# Patient Record
Sex: FEMALE | Race: WHITE | Employment: FULL TIME | URBAN - METROPOLITAN AREA
[De-identification: names, ages, dates, MRNs, and addresses within clinical notes are randomized per-mention and may not be internally consistent; named-entity substitution may affect disease eponyms.]

---

## 2017-02-17 ENCOUNTER — HOSPITAL ENCOUNTER (OUTPATIENT)
Dept: GENERAL RADIOLOGY | Age: 57
Discharge: HOME | End: 2017-02-17
Attending: FAMILY MEDICINE
Payer: COMMERCIAL

## 2017-02-17 DIAGNOSIS — R05.9 COUGH: ICD-10-CM

## 2017-02-17 PROCEDURE — 71020 XR CHEST PA LAT: CPT

## 2018-03-16 ENCOUNTER — HOSPITAL ENCOUNTER (EMERGENCY)
Age: 58
Discharge: HOME | End: 2018-03-16
Attending: EMERGENCY MEDICINE | Admitting: EMERGENCY MEDICINE
Payer: COMMERCIAL

## 2018-03-16 VITALS
HEIGHT: 63 IN | OXYGEN SATURATION: 97 % | TEMPERATURE: 98 F | DIASTOLIC BLOOD PRESSURE: 81 MMHG | WEIGHT: 160 LBS | RESPIRATION RATE: 18 BRPM | SYSTOLIC BLOOD PRESSURE: 138 MMHG | HEART RATE: 85 BPM | BODY MASS INDEX: 28.35 KG/M2

## 2018-03-16 DIAGNOSIS — T78.3XXA ANGIOEDEMA, INITIAL ENCOUNTER: Primary | ICD-10-CM

## 2018-03-16 PROCEDURE — 96376 TX/PRO/DX INJ SAME DRUG ADON: CPT

## 2018-03-16 PROCEDURE — 96374 THER/PROPH/DIAG INJ IV PUSH: CPT

## 2018-03-16 PROCEDURE — 96375 TX/PRO/DX INJ NEW DRUG ADDON: CPT

## 2018-03-16 PROCEDURE — 99284 EMERGENCY DEPT VISIT MOD MDM: CPT

## 2018-03-16 PROCEDURE — 74011250636 HC RX REV CODE- 250/636: Performed by: EMERGENCY MEDICINE

## 2018-03-16 PROCEDURE — 74011000250 HC RX REV CODE- 250: Performed by: EMERGENCY MEDICINE

## 2018-03-16 RX ORDER — DIPHENHYDRAMINE HYDROCHLORIDE 50 MG/ML
25 INJECTION, SOLUTION INTRAMUSCULAR; INTRAVENOUS
Status: COMPLETED | OUTPATIENT
Start: 2018-03-16 | End: 2018-03-16

## 2018-03-16 RX ORDER — FAMOTIDINE 10 MG/ML
20 INJECTION INTRAVENOUS
Status: COMPLETED | OUTPATIENT
Start: 2018-03-16 | End: 2018-03-16

## 2018-03-16 RX ORDER — PREDNISONE 20 MG/1
60 TABLET ORAL DAILY
Qty: 15 TAB | Refills: 0 | Status: SHIPPED | OUTPATIENT
Start: 2018-03-16 | End: 2018-03-21

## 2018-03-16 RX ORDER — EPINEPHRINE 0.3 MG/.3ML
0.3 INJECTION SUBCUTANEOUS
Qty: 0.6 ML | Refills: 2 | Status: SHIPPED | OUTPATIENT
Start: 2018-03-16

## 2018-03-16 RX ORDER — FAMOTIDINE 20 MG/1
20 TABLET, FILM COATED ORAL 2 TIMES DAILY
Qty: 20 TAB | Refills: 0 | Status: SHIPPED | OUTPATIENT
Start: 2018-03-16 | End: 2018-03-24

## 2018-03-16 RX ADMIN — DIPHENHYDRAMINE HYDROCHLORIDE 25 MG: 50 INJECTION, SOLUTION INTRAMUSCULAR; INTRAVENOUS at 06:54

## 2018-03-16 RX ADMIN — METHYLPREDNISOLONE SODIUM SUCCINATE 125 MG: 125 INJECTION, POWDER, FOR SOLUTION INTRAMUSCULAR; INTRAVENOUS at 06:52

## 2018-03-16 RX ADMIN — FAMOTIDINE 20 MG: 10 INJECTION INTRAVENOUS at 06:55

## 2018-03-16 RX ADMIN — DIPHENHYDRAMINE HYDROCHLORIDE 25 MG: 50 INJECTION, SOLUTION INTRAMUSCULAR; INTRAVENOUS at 08:23

## 2018-03-16 NOTE — ED TRIAGE NOTES
Patient started having swelling on the left side of her face last night, took benadryl, when woke up this morning it was worse. States has been getting random hives over the last couple of months. unk what is causing it.

## 2018-03-16 NOTE — DISCHARGE INSTRUCTIONS
Angioedema: Care Instructions  Your Care Instructions    Angioedema is an allergic reaction. It causes swelling and welts in the deep layers of the skin. Angioedema can sometimes occur along with hives. Hives are an allergic reaction in the outer layers of the skin. Angioedema can range from mild to severe. Painful welts can develop on the face. Angioedema can also occur on other parts of the body. In severe cases, the inside of the throat can swell and make it hard to breathe. Many things can cause this condition, including foods, insect bites, and medicines (such as aspirin and some blood pressure medicines). It also can run in families. Sometimes you may know what caused the reaction, but other times you may not know. Follow-up care is a key part of your treatment and safety. Be sure to make and go to all appointments, and call your doctor if you are having problems. It's also a good idea to know your test results and keep a list of the medicines you take. How can you care for yourself at home? · Take your medicines exactly as prescribed. Call your doctor if you think you are having a problem with your medicine. You will get more details on the specific medicines your doctor prescribes. Some medicines used to treat angioedema can make you too sleepy to drive safely. Do not drive if you take medicine that may make you sleepy. · Avoid foods or medicine that may have triggered the swelling. · For comfort:  ¨ Try taking a cool bath. Or place a cool, wet towel on the swollen area. ¨ Avoid hot baths and showers. ¨ Wear loose clothing. · Your doctor may prescribe a shot of epinephrine to carry with you in case you have a severe reaction. Learn how to give yourself the shot and keep it with you at all times. Make sure it has not . When should you call for help? Give an epinephrine shot if:  ? · You think you are having a severe allergic reaction. ? After giving an epinephrine shot call 911, even if you feel better. ?Call 911 if:  ? · You have symptoms of a severe allergic reaction. These may include:  ¨ Sudden raised, red areas (hives) all over your body. ¨ Swelling of the throat, mouth, lips, or tongue. ¨ Trouble breathing. ¨ Passing out (losing consciousness). Or you may feel very lightheaded or suddenly feel weak, confused, or restless. ? · You have been given an epinephrine shot, even if you feel better. ?Call your doctor now or seek immediate medical care if:  ? · You have symptoms of an allergic reaction, such as:  ¨ A rash or hives (raised, red areas on the skin). ¨ Itching. ¨ Swelling. ¨ Belly pain, nausea, or vomiting. ? Watch closely for changes in your health, and be sure to contact your doctor if:  ? · You do not get better as expected. Where can you learn more? Go to http://cricket-sanju.info/. Enter J709 in the search box to learn more about \"Angioedema: Care Instructions. \"  Current as of: September 29, 2016  Content Version: 11.4  © 5821-0415 [employer]. Care instructions adapted under license by [employer] (which disclaims liability or warranty for this information). If you have questions about a medical condition or this instruction, always ask your healthcare professional. [place] [address] any warranty or liability for your use of this information. Allergic Reaction: Care Instructions  Your Care Instructions    An allergic reaction is an excessive response from your immune system to a medicine, chemical, food, insect bite, or other substance. A reaction can range from mild to life-threatening. Some people have a mild rash, hives, and itching or stomach cramps. In severe reactions, swelling of your tongue and throat can close up your airway so that you cannot breathe. Follow-up care is a key part of your treatment and safety.  Be sure to make and go to all appointments, and call your doctor if you are having problems. It's also a good idea to know your test results and keep a list of the medicines you take. How can you care for yourself at home? · If you know what caused your allergic reaction, be sure to avoid it. Your allergy may become more severe each time you have a reaction. · Take an over-the-counter antihistamine, such as cetirizine (Zyrtec) or loratadine (Claritin), to treat mild symptoms. Read and follow directions on the label. Some antihistamines can make you feel sleepy. Do not give antihistamines to a child unless you have checked with your doctor first. Mild symptoms include sneezing or an itchy or runny nose; an itchy mouth; a few hives or mild itching; and mild nausea or stomach discomfort. · Do not scratch hives or a rash. Put a cold, moist towel on them or take cool baths to relieve itching. Put ice packs on hives, swelling, or insect stings for 10 to 15 minutes at a time. Put a thin cloth between the ice pack and your skin. Do not take hot baths or showers. They will make the itching worse. · Your doctor may prescribe a shot of epinephrine to carry with you in case you have a severe reaction. Learn how to give yourself the shot and keep it with you at all times. Make sure it is not . · Go to the emergency room every time you have a severe reaction, even if you have used your shot of epinephrine and are feeling better. Symptoms can come back after a shot. · Wear medical alert jewelry that lists your allergies. You can buy this at most [employer]. · If your child has a severe allergy, make sure that his or her teachers, babysitters, coaches, and other caregivers know about the allergy. They should have an epinephrine shot, know how and when to give it, and have a plan to take your child to the hospital.  When should you call for help? Give an epinephrine shot if:  ? · You think you are having a severe allergic reaction.    ? · You have symptoms in more than one body area, such as mild nausea and an itchy mouth. ? After giving an epinephrine shot call 911, even if you feel better. ?Call 911 if:  ? · You have symptoms of a severe allergic reaction. These may include:  ¨ Sudden raised, red areas (hives) all over your body. ¨ Swelling of the throat, mouth, lips, or tongue. ¨ Trouble breathing. ¨ Passing out (losing consciousness). Or you may feel very lightheaded or suddenly feel weak, confused, or restless. ? · You have been given an epinephrine shot, even if you feel better. ?Call your doctor now or seek immediate medical care if:  ? · You have symptoms of an allergic reaction, such as:  ¨ A rash or hives (raised, red areas on the skin). ¨ Itching. ¨ Swelling. ¨ Belly pain, nausea, or vomiting. ? Watch closely for changes in your health, and be sure to contact your doctor if:  ? · You do not get better as expected. Where can you learn more? Go to http://cricket-sanju.info/. Enter F887 in the search box to learn more about \"Allergic Reaction: Care Instructions. \"  Current as of: September 29, 2016  Content Version: 11.4  © 0153-3977 [employer]. Care instructions adapted under license by [employer] (which disclaims liability or warranty for this information). If you have questions about a medical condition or this instruction, always ask your healthcare professional. [place] [address] any warranty or liability for your use of this information.

## 2018-03-16 NOTE — ED NOTES
Assumed care of pt from triage. Pt presents to ED with chief complaint of swelling to left side of face. PT reports the left side above her mouth started swelling last night around 2300 and this morning her whole left side of face was swollen. Pt denies any one sided weakness, denies numbness, denies SOB, denies trouble swallowing, denies blurry vision and denies dental pain. Pt reports she had hives off and on for the past few months and two days ago had hives to right hand. Pt reports she took a benadryl for the hives and they went away. Pt denies taking any new medications, eating or drinking anything new and denies any new lotions, soaps or detergents. Pt is A&O x 4. Pt denies any other symptoms at this time. Pt resting comfortably on the stretcher in a position of comfort. Pt in no acute distress at this time. Call bell within reach. Side rails x 2. Cardiac monitor x 3. Stretcher locked in the lowest position. Pt aware of plan to await for MD/AMBER/NP assessment, and pt/family verbalizes understanding. Will continue to monitor.

## 2018-03-16 NOTE — Clinical Note
- Prednisone for 5 days. Begin today by noon. - Benadryl 25-50 mg by mouth every 4 hrs as needed. - Pepcid twice a day for 5 days. - Epipen for any difficulty breathing. 
- Please follow-up with allergy. 
- Return to ED for any concerns.

## 2018-03-16 NOTE — ED NOTES
7:25 AM  Change of shift to Dr. Leona Delgado. Care of patient taken over from Dr. Mary Gay; H&P reviewed, handoff complete. Awaiting labs/imaging/consultant. 9:50 AM  Minimal left sided facial swelling. No airway compromise. Feels better. Swelling is improving    9:51 AM  Patient's results have been reviewed with them. Patient and/or family have verbally conveyed their understanding and agreement of the patient's signs, symptoms, diagnosis, treatment and prognosis and additionally agree to follow up as recommended or return to the Emergency Room should their condition change prior to follow-up. Discharge instructions have also been provided to the patient with some educational information regarding their diagnosis as well a list of reasons why they would want to return to the ER prior to their follow-up appointment should their condition change.

## 2018-03-16 NOTE — ED NOTES
Bedside and verbal report given to Fidelia Oshea RN on [facility] at shift change for routine progression of care. Report consisted of patients Situation, Background, Assessment and Recommendations(SBAR). Information from the following report(s) SBAR, ED Summary, [facility] and Recent Results was reviewed with the receiving nurse. Opportunity for questions and clarification was provided.

## 2018-03-16 NOTE — ED PROVIDER NOTES
HPI Comments: The patient presents to the ED with L sided facial swelling. The swelling began last night at 11 PM. She took 25 mg of Benadryl. When she awoke this AM, she had more facial swelling. She has been getting intermittent hives for the last few months. Usually, the symptoms resolve with Benadyrl. She has no known allergies. She has not seen an allergist. She has never had facial swelling. She is not on and ACE-I or and ARB. She denies any difficulty breathing or difficulty swallowing. She denies any new soaps, detergents, cosmetics, lotions, foods, etc. She has no other concerns. Patient is a 63 y.o. female presenting with allergic reaction. The history is provided by the patient and the spouse. Allergic Reaction    Pertinent negatives include no nausea, no vomiting and no shortness of breath. Past Medical History:   Diagnosis Date    Adenomyosis     AR (allergic rhinitis) 6/10/2009    Asthma 6/10/2009    Dysfunctional uterine bleeding     Fibromyalgia 6/10/2009    Generalized anxiety disorder     GERD (gastroesophageal reflux disease) 6/10/2009    Hypermenorrhea     Infertility, female     Inflammatory arthritis     Iron deficiency anemia 6/10/2009       Past Surgical History:   Procedure Laterality Date    HX  SECTION      HX DILATION AND CURETTAGE           Family History:   Problem Relation Age of Onset    Hypertension Mother     Heart Disease Father     Hypertension Father     Cancer Paternal Aunt      brain       Social History     Social History    Marital status:      Spouse name: N/A    Number of children: N/A    Years of education: N/A     Occupational History    Not on file.      Social History Main Topics    Smoking status: Never Smoker    Smokeless tobacco: Never Used    Alcohol use Yes      Comment: occasionally    Drug use: No    Sexual activity: Not Currently     Birth control/ protection: None     Other Topics Concern    Not on file Social History Narrative         ALLERGIES: Hydrocodone-guaifenesin and Other medication    Review of Systems   Constitutional: Negative for appetite change, chills and fever. HENT: Positive for facial swelling. Negative for congestion, dental problem, nosebleeds and sore throat. Eyes: Negative for pain and discharge. Respiratory: Negative for cough and shortness of breath. Cardiovascular: Negative for chest pain. Gastrointestinal: Negative for abdominal pain, diarrhea, nausea and vomiting. Genitourinary: Negative for dysuria. Musculoskeletal: Negative. Skin: Negative for rash. Neurological: Negative for weakness and headaches. Hematological: Negative for adenopathy. Psychiatric/Behavioral: Negative. All other systems reviewed and are negative. Vitals:    03/16/18 0627   BP: (!) 155/98   Pulse: 85   Resp: 18   Temp: 98 °F (36.7 °C)   SpO2: 98%   Weight: 72.6 kg (160 lb)   Height: 5' 3\" (1.6 m)            Physical Exam   Constitutional: She is oriented to person, place, and time. She appears well-developed and well-nourished. HENT:   Head: Normocephalic and atraumatic. Mouth/Throat: Oropharynx is clear and moist.   L lower facial edema noted. Eyes: Conjunctivae are normal.   Neck: Normal range of motion. Neck supple. Cardiovascular: Normal rate, regular rhythm and normal heart sounds. Pulmonary/Chest: Effort normal and breath sounds normal.   Abdominal: Soft. Bowel sounds are normal. There is no tenderness. Musculoskeletal: Normal range of motion. She exhibits no edema or tenderness. Neurological: She is alert and oriented to person, place, and time. Skin: Skin is warm and dry. Psychiatric: She has a normal mood and affect. Her behavior is normal.   Nursing note and vitals reviewed. [facility]      ED Course       Procedures    A/P:  1. Angioedema - unclear etiology. Benadryl, Prednisone, Pepcid. F/U with allergist.    7:40 AM  Swelling slightly improved.  She would like another dose of Benadryl.    8:03 AM  Change of shift. Care of patient signed over to Dr. Ousmane Batista. Handoff complete.

## 2018-03-24 ENCOUNTER — HOSPITAL ENCOUNTER (EMERGENCY)
Age: 58
Discharge: HOME | End: 2018-03-24
Attending: EMERGENCY MEDICINE
Payer: COMMERCIAL

## 2018-03-24 ENCOUNTER — APPOINTMENT (OUTPATIENT)
Dept: GENERAL RADIOLOGY | Age: 58
End: 2018-03-24
Attending: NURSE PRACTITIONER
Payer: COMMERCIAL

## 2018-03-24 VITALS
SYSTOLIC BLOOD PRESSURE: 116 MMHG | WEIGHT: 165 LBS | DIASTOLIC BLOOD PRESSURE: 69 MMHG | HEIGHT: 63 IN | RESPIRATION RATE: 17 BRPM | OXYGEN SATURATION: 93 % | TEMPERATURE: 98.1 F | BODY MASS INDEX: 29.23 KG/M2 | HEART RATE: 95 BPM

## 2018-03-24 DIAGNOSIS — M79.10 MYALGIA: ICD-10-CM

## 2018-03-24 DIAGNOSIS — L50.9 URTICARIA: ICD-10-CM

## 2018-03-24 DIAGNOSIS — R10.13 ABDOMINAL PAIN, EPIGASTRIC: Primary | ICD-10-CM

## 2018-03-24 DIAGNOSIS — R11.0 NAUSEA WITHOUT VOMITING: ICD-10-CM

## 2018-03-24 LAB
ALBUMIN SERPL-MCNC: 4 G/DL (ref 3.5–5)
ALBUMIN/GLOB SERPL: 1.1 {RATIO} (ref 1.1–2.2)
ALP SERPL-CCNC: 88 U/L (ref 45–117)
ALT SERPL-CCNC: 20 U/L (ref 12–78)
ANION GAP SERPL CALC-SCNC: 9 MMOL/L (ref 5–15)
AST SERPL-CCNC: 18 U/L (ref 15–37)
BASOPHILS # BLD: 0 K/UL (ref 0–0.1)
BASOPHILS NFR BLD: 0 % (ref 0–1)
BILIRUB SERPL-MCNC: 0.6 MG/DL (ref 0.2–1)
BUN SERPL-MCNC: 18 MG/DL (ref 6–20)
BUN/CREAT SERPL: 17 (ref 12–20)
CALCIUM SERPL-MCNC: 9.1 MG/DL (ref 8.5–10.1)
CHLORIDE SERPL-SCNC: 102 MMOL/L (ref 97–108)
CO2 SERPL-SCNC: 31 MMOL/L (ref 21–32)
CREAT SERPL-MCNC: 1.08 MG/DL (ref 0.55–1.02)
DIFFERENTIAL METHOD BLD: ABNORMAL
EOSINOPHIL # BLD: 0.1 K/UL (ref 0–0.4)
EOSINOPHIL NFR BLD: 1 % (ref 0–7)
ERYTHROCYTE [DISTWIDTH] IN BLOOD BY AUTOMATED COUNT: 13.2 % (ref 11.5–14.5)
GLOBULIN SER CALC-MCNC: 3.8 G/DL (ref 2–4)
GLUCOSE SERPL-MCNC: 103 MG/DL (ref 65–100)
HCT VFR BLD AUTO: 47.8 % (ref 35–47)
HGB BLD-MCNC: 15.8 G/DL (ref 11.5–16)
IMM GRANULOCYTES # BLD: 0.1 K/UL (ref 0–0.04)
IMM GRANULOCYTES NFR BLD AUTO: 1 % (ref 0–0.5)
LYMPHOCYTES # BLD: 3.4 K/UL (ref 0.8–3.5)
LYMPHOCYTES NFR BLD: 27 % (ref 12–49)
MCH RBC QN AUTO: 30.1 PG (ref 26–34)
MCHC RBC AUTO-ENTMCNC: 33.1 G/DL (ref 30–36.5)
MCV RBC AUTO: 91 FL (ref 80–99)
MONOCYTES # BLD: 1 K/UL (ref 0–1)
MONOCYTES NFR BLD: 8 % (ref 5–13)
NEUTS SEG # BLD: 8 K/UL (ref 1.8–8)
NEUTS SEG NFR BLD: 64 % (ref 32–75)
NRBC # BLD: 0 K/UL (ref 0–0.01)
NRBC BLD-RTO: 0 PER 100 WBC
PLATELET # BLD AUTO: 216 K/UL (ref 150–400)
PMV BLD AUTO: 9.7 FL (ref 8.9–12.9)
POTASSIUM SERPL-SCNC: 4 MMOL/L (ref 3.5–5.1)
PROT SERPL-MCNC: 7.8 G/DL (ref 6.4–8.2)
RBC # BLD AUTO: 5.25 M/UL (ref 3.8–5.2)
SODIUM SERPL-SCNC: 142 MMOL/L (ref 136–145)
WBC # BLD AUTO: 12.5 K/UL (ref 3.6–11)

## 2018-03-24 PROCEDURE — 74011250637 HC RX REV CODE- 250/637: Performed by: NURSE PRACTITIONER

## 2018-03-24 PROCEDURE — 85025 COMPLETE CBC W/AUTO DIFF WBC: CPT | Performed by: NURSE PRACTITIONER

## 2018-03-24 PROCEDURE — 71046 X-RAY EXAM CHEST 2 VIEWS: CPT

## 2018-03-24 PROCEDURE — 99284 EMERGENCY DEPT VISIT MOD MDM: CPT

## 2018-03-24 PROCEDURE — 80053 COMPREHEN METABOLIC PANEL: CPT | Performed by: NURSE PRACTITIONER

## 2018-03-24 PROCEDURE — 74011000250 HC RX REV CODE- 250: Performed by: NURSE PRACTITIONER

## 2018-03-24 PROCEDURE — 36415 COLL VENOUS BLD VENIPUNCTURE: CPT | Performed by: NURSE PRACTITIONER

## 2018-03-24 RX ORDER — FAMOTIDINE 20 MG/1
20 TABLET, FILM COATED ORAL 2 TIMES DAILY
Qty: 20 TAB | Refills: 0 | Status: SHIPPED | OUTPATIENT
Start: 2018-03-24 | End: 2018-04-03

## 2018-03-24 RX ADMIN — LIDOCAINE HYDROCHLORIDE 40 ML: 20 SOLUTION ORAL; TOPICAL at 17:36

## 2018-03-24 NOTE — DISCHARGE INSTRUCTIONS

## 2018-03-24 NOTE — ED PROVIDER NOTES
HPI Comments: The patient is a 56-year-old female with a past medical history significant for lupus, iron deficiency anemia, GERD, asthma, inflammatory arthritis, and generalized anxiety disorder comes to the ED tonight ambulatory with complaints of shooting pain from her epigastrium to her neck with swallowing. Patient states the pain began just prior to arrival. Patient states it initially started out of the blue and was with the swallow. As time passed it became every second or third swallow. Patient was recently seen in the ED on March 16 for angioedema. She also has a history of recurrent urticaria that are large in size that appear and disappear without reason. Patient has attempt to schedule followup per Dr. Truong Griggs orders with an allergist however the allergist has not returned her call. Patient states her her symptoms include nausea, belching, joint pain as well as the pain that begins at the epigastrium and radiates up into her neck. Patient denies fever, chills, vomiting, flutters, bloating, diarrhea or constipation. Patient is recently completing a second course of steroid. The first being 4 pneumonia and the second being for the angioedema. Patient sees Dr. Jr Yoon for her lupus. It has been several years since she is seeing him. She weaned herself off medications and went gluten-free. During that time she felt pretty well. Patient has been than that diet as of late and is now feeling worse again. Patient states she takes Benadryl at night for allergy symptoms and has recently completed the prednisone as above. She also has an EpiPen in case of angioedema. She has no further complaints at this time. Primary care provider:Evert Mera MD      The history is provided by the patient. No  was used.         Past Medical History:   Diagnosis Date    Adenomyosis     AR (allergic rhinitis) 6/10/2009    Asthma 6/10/2009    Dysfunctional uterine bleeding     Fibromyalgia 6/10/2009    Generalized anxiety disorder     GERD (gastroesophageal reflux disease) 6/10/2009    Hypermenorrhea     Infertility, female     Inflammatory arthritis     Iron deficiency anemia 6/10/2009       Past Surgical History:   Procedure Laterality Date    HX  SECTION      HX DILATION AND CURETTAGE           Family History:   Problem Relation Age of Onset    Hypertension Mother     Heart Disease Father     Hypertension Father     Cancer Paternal Aunt      brain       Social History     Social History    Marital status:      Spouse name: N/A    Number of children: N/A    Years of education: N/A     Occupational History    Not on file. Social History Main Topics    Smoking status: Never Smoker    Smokeless tobacco: Never Used    Alcohol use Yes      Comment: occasionally    Drug use: No    Sexual activity: Not Currently     Birth control/ protection: None     Other Topics Concern    Not on file     Social History Narrative         ALLERGIES: Hydrocodone-guaifenesin and Other medication    Review of Systems   Constitutional: Negative for appetite change, chills and fever. HENT: Positive for sore throat and trouble swallowing. Respiratory: Negative for cough, shortness of breath and wheezing. Cardiovascular: Negative for chest pain. Gastrointestinal: Negative for abdominal pain, constipation, diarrhea, nausea and vomiting. Genitourinary: Negative for dysuria and urgency. Musculoskeletal: Negative for back pain. Skin: Negative for color change and rash. Neurological: Negative for dizziness and headaches. Psychiatric/Behavioral: Negative. All other systems reviewed and are negative. Vitals:    18 1738 18 1745 18 1845 18 1900   BP:  136/74 116/71 116/69   Pulse:       Resp:       Temp:       SpO2: 98% 100% 97% 93%   Weight:       Height:                Physical Exam   Constitutional: She is oriented to person, place, and time.  She appears well-developed and well-nourished. HENT:   Head: Normocephalic and atraumatic. Mouth/Throat: Uvula is midline, oropharynx is clear and moist and mucous membranes are normal.   Neck: Trachea normal, normal range of motion, full passive range of motion without pain and phonation normal. Neck supple. No tracheal tenderness and no muscular tenderness present. No rigidity. No tracheal deviation, no edema, no erythema and normal range of motion present. Cardiovascular: Normal rate, regular rhythm, normal heart sounds and intact distal pulses. Pulses:       Radial pulses are 2+ on the right side, and 2+ on the left side. Pulmonary/Chest: Effort normal and breath sounds normal. No stridor. No respiratory distress. She has no decreased breath sounds. She has no wheezes. She has no rhonchi. She has no rales. She exhibits no tenderness. Abdominal: Soft. Normal appearance and bowel sounds are normal. There is tenderness in the epigastric area. There is no guarding and no CVA tenderness. Musculoskeletal: Normal range of motion. Neurological: She is alert and oriented to person, place, and time. Skin: Skin is warm and dry. No erythema. Psychiatric: She has a normal mood and affect. Her behavior is normal. Judgment and thought content normal.   Nursing note and vitals reviewed. [facility]      ED Course     Assessment & Plan:     Orders Placed This Encounter    XR CHEST PA AND LATERAL    CBC WITH AUTOMATED DIFF    METABOLIC PANEL, COMPREHENSIVE    mylanta/viscous lidocaine (FIORDALIZA)(GI COCKTAIL)    famotidine (PEPCID) 20 mg tablet       Discussed with Acosta Sheriff. Norman Jackson MD, ED Provider    Lynn Valverde NP  03/24/18  5:23 PM    GI Cocktail brought some relief to her symptoms  CXR Negative  Labs not concerning except for slightly elevated creatinine. Discussed with patient need to stay hydrated and to have PCP follow-up levels in the coming weeks. Will encourage her to continue the Pepcid for now.      Romulo Marie is likely a rheumatological syndrome of some sort with the muscle and joint aches and urticaria. Encouraged follow with Rheumatology and Allergy ASAP. Procedures      7:39 PM  The patient has been reevaluated. The patient is ready for discharge. The patient's signs, symptoms, diagnosis, and discharge instructions have been discussed and the patient/ family has conveyed their understanding. The patient is to follow up as recommended or return to the ED should their symptoms worsen. Plan has been discussed and the patient is in agreement. LABORATORY TESTS:  Labs Reviewed   CBC WITH AUTOMATED DIFF - Abnormal; Notable for the following:        Result Value    WBC 12.5 (*)     RBC 5.25 (*)     HCT 47.8 (*)     IMMATURE GRANULOCYTES 1 (*)     ABS. IMM. GRANS. 0.1 (*)     All other components within normal limits   METABOLIC PANEL, COMPREHENSIVE - Abnormal; Notable for the following:     Glucose 103 (*)     Creatinine 1.08 (*)     GFR est non-AA 52 (*)     All other components within normal limits       IMAGING RESULTS:  Xr Chest Pa Lat    Result Date: 3/24/2018  EXAM:  XR CHEST PA LAT INDICATION:   eval for mediastinal air sore throat chest pain COMPARISON: 2017. FINDINGS: PA and lateral radiographs of the chest demonstrate clear lungs. The cardiac and mediastinal contours and pulmonary vascularity are normal.  The bones and soft tissues are within normal limits. IMPRESSION: No acute abnormality         MEDICATIONS GIVEN:  Medications   mylanta/viscous lidocaine (FIORDALIZA)(GI COCKTAIL) (40 mL Oral Given 3/24/18 4750)       IMPRESSION:  1. Abdominal pain, epigastric    2. Urticaria    3. Nausea without vomiting    4. Myalgia        PLAN:  1. Discharge Medication List as of 3/24/2018  7:27 PM        2.    Follow-up Information     Follow up With Details Comments Contact Info    Yinka Fernandez MD Schedule an appointment as soon as possible for a visit  [address] [place] [phone]  [address] Marli Vasquez MD Schedule an appointment as soon as possible for a visit To see him or his nurse practitioner Aashish Woodward for further work-up of symptoms [address] [place] [address] [phone] [phone]      Dori Alvarado MD Schedule an appointment as soon as possible for a visit as soon as possible [place] [address]  [phone]      [facility] EMERGENCY DEPT  As needed, If symptoms worsen [address] [facility]  [phone]        3.      Return to ED for new or worsening symptoms       Judah Devi NP

## 2018-03-24 NOTE — Clinical Note
Thank you for allowing us to care for you today. Please follow-up with your Primary Care provider in the next 2-3 days if your symptoms do not improve. Plan for home: Follow-up with primary care, Rheumatology & allergy as soon as possible.

## 2018-03-24 NOTE — ED NOTES
Bedside and Verbal shift change report given to LUIS (oncoming nurse) by Stefano Blanco (offgoing nurse). Report included the following information SBAR, ED Summary, MAR and Recent Results.

## 2018-08-31 ENCOUNTER — HOSPITAL ENCOUNTER (OUTPATIENT)
Dept: MAMMOGRAPHY | Age: 58
Discharge: HOME | End: 2018-08-31
Attending: FAMILY MEDICINE
Payer: COMMERCIAL

## 2018-08-31 DIAGNOSIS — Z12.39 SCREENING BREAST EXAMINATION: ICD-10-CM

## 2018-08-31 PROCEDURE — 77067 SCR MAMMO BI INCL CAD: CPT

## 2019-12-22 ENCOUNTER — HOSPITAL ENCOUNTER (EMERGENCY)
Age: 59
Discharge: HOME | End: 2019-12-22
Attending: STUDENT IN AN ORGANIZED HEALTH CARE EDUCATION/TRAINING PROGRAM
Payer: COMMERCIAL

## 2019-12-22 ENCOUNTER — APPOINTMENT (OUTPATIENT)
Dept: GENERAL RADIOLOGY | Age: 59
End: 2019-12-22
Attending: STUDENT IN AN ORGANIZED HEALTH CARE EDUCATION/TRAINING PROGRAM
Payer: COMMERCIAL

## 2019-12-22 VITALS
DIASTOLIC BLOOD PRESSURE: 74 MMHG | RESPIRATION RATE: 16 BRPM | OXYGEN SATURATION: 99 % | BODY MASS INDEX: 31.64 KG/M2 | SYSTOLIC BLOOD PRESSURE: 129 MMHG | WEIGHT: 178.57 LBS | HEART RATE: 78 BPM | HEIGHT: 63 IN | TEMPERATURE: 97.5 F

## 2019-12-22 DIAGNOSIS — R03.0 ELEVATED BLOOD PRESSURE READING: ICD-10-CM

## 2019-12-22 DIAGNOSIS — R07.89 CHEST DISCOMFORT: Primary | ICD-10-CM

## 2019-12-22 LAB
ALBUMIN SERPL-MCNC: 4.1 G/DL (ref 3.5–5)
ALBUMIN/GLOB SERPL: 1.2 {RATIO} (ref 1.1–2.2)
ALP SERPL-CCNC: 80 U/L (ref 45–117)
ALT SERPL-CCNC: 21 U/L (ref 12–78)
ANION GAP SERPL CALC-SCNC: 9 MMOL/L (ref 5–15)
APPEARANCE UR: CLEAR
AST SERPL-CCNC: 14 U/L (ref 15–37)
BASOPHILS # BLD: 0 K/UL (ref 0–0.1)
BASOPHILS NFR BLD: 0 % (ref 0–1)
BILIRUB SERPL-MCNC: 0.7 MG/DL (ref 0.2–1)
BILIRUB UR QL: NEGATIVE
BUN SERPL-MCNC: 13 MG/DL (ref 6–20)
BUN/CREAT SERPL: 15 (ref 12–20)
CALCIUM SERPL-MCNC: 9.1 MG/DL (ref 8.5–10.1)
CHLORIDE SERPL-SCNC: 104 MMOL/L (ref 97–108)
CO2 SERPL-SCNC: 27 MMOL/L (ref 21–32)
COLOR UR: NORMAL
CREAT SERPL-MCNC: 0.88 MG/DL (ref 0.55–1.02)
DIFFERENTIAL METHOD BLD: NORMAL
EOSINOPHIL # BLD: 0.1 K/UL (ref 0–0.4)
EOSINOPHIL NFR BLD: 2 % (ref 0–7)
ERYTHROCYTE [DISTWIDTH] IN BLOOD BY AUTOMATED COUNT: 12.8 % (ref 11.5–14.5)
GLOBULIN SER CALC-MCNC: 3.5 G/DL (ref 2–4)
GLUCOSE SERPL-MCNC: 93 MG/DL (ref 65–100)
GLUCOSE UR STRIP.AUTO-MCNC: NEGATIVE MG/DL
HCT VFR BLD AUTO: 44.3 % (ref 35–47)
HGB BLD-MCNC: 14.6 G/DL (ref 11.5–16)
HGB UR QL STRIP: NEGATIVE
KETONES UR QL STRIP.AUTO: NEGATIVE MG/DL
LEUKOCYTE ESTERASE UR QL STRIP.AUTO: NEGATIVE
LYMPHOCYTES # BLD: 1.5 K/UL (ref 0.8–3.5)
LYMPHOCYTES NFR BLD: 23 % (ref 12–49)
MCH RBC QN AUTO: 29.7 PG (ref 26–34)
MCHC RBC AUTO-ENTMCNC: 33 G/DL (ref 30–36.5)
MCV RBC AUTO: 90.2 FL (ref 80–99)
MONOCYTES # BLD: 0.4 K/UL (ref 0–1)
MONOCYTES NFR BLD: 6 % (ref 5–13)
NEUTS SEG # BLD: 4.2 K/UL (ref 1.8–8)
NEUTS SEG NFR BLD: 69 % (ref 32–75)
NITRITE UR QL STRIP.AUTO: NEGATIVE
PH UR STRIP: 5 [PH] (ref 5–8)
PLATELET # BLD AUTO: 173 K/UL (ref 150–400)
PMV BLD AUTO: 10.7 FL (ref 8.9–12.9)
POTASSIUM SERPL-SCNC: 3.6 MMOL/L (ref 3.5–5.1)
PROT SERPL-MCNC: 7.6 G/DL (ref 6.4–8.2)
PROT UR STRIP-MCNC: NEGATIVE MG/DL
RBC # BLD AUTO: 4.91 M/UL (ref 3.8–5.2)
SODIUM SERPL-SCNC: 140 MMOL/L (ref 136–145)
SP GR UR REFRACTOMETRY: 1.02 (ref 1–1.03)
TROPONIN I SERPL-MCNC: <0.05 NG/ML
UROBILINOGEN UR QL STRIP.AUTO: 0.2 EU/DL (ref 0.2–1)
WBC # BLD AUTO: 6.2 K/UL (ref 3.6–11)

## 2019-12-22 PROCEDURE — 71046 X-RAY EXAM CHEST 2 VIEWS: CPT

## 2019-12-22 PROCEDURE — 84484 ASSAY OF TROPONIN QUANT: CPT

## 2019-12-22 PROCEDURE — 99285 EMERGENCY DEPT VISIT HI MDM: CPT

## 2019-12-22 PROCEDURE — 36415 COLL VENOUS BLD VENIPUNCTURE: CPT

## 2019-12-22 PROCEDURE — 93005 ELECTROCARDIOGRAM TRACING: CPT

## 2019-12-22 PROCEDURE — 80053 COMPREHEN METABOLIC PANEL: CPT

## 2019-12-22 PROCEDURE — 85025 COMPLETE CBC W/AUTO DIFF WBC: CPT

## 2019-12-22 PROCEDURE — 81003 URINALYSIS AUTO W/O SCOPE: CPT

## 2019-12-22 RX ORDER — DIPHENHYDRAMINE HCL 25 MG
25 TABLET ORAL
COMMUNITY

## 2019-12-22 NOTE — ED PROVIDER NOTES
Patient is a 70-year-old female presenting to the emergency department today with elevated blood pressure. She states that for the past several weeks she has noticed that her blood pressure is been high, 180s over 100s typically. Years ago she was on blood pressure medications but is unsure what. She reports that she has been under a lot of stress recently. Since yesterday she has had a dull posterior headache as well as mild tightness in her chest.  She often has tightness in her chest from asthma but this feels little bit different. She denies dizziness, blurred vision, focal weakness or numbness, abdominal pain, vomiting, diarrhea. She does note that she feels \"off\". No new medications.            Past Medical History:   Diagnosis Date    Adenomyosis     AR (allergic rhinitis) 6/10/2009    Asthma 6/10/2009    Dysfunctional uterine bleeding     Fibromyalgia 6/10/2009    Generalized anxiety disorder     GERD (gastroesophageal reflux disease) 6/10/2009    Hypermenorrhea     Infertility, female     Inflammatory arthritis     Iron deficiency anemia 6/10/2009       Past Surgical History:   Procedure Laterality Date    HX  SECTION      HX DILATION AND CURETTAGE           Family History:   Problem Relation Age of Onset    Hypertension Mother     Heart Disease Father     Hypertension Father     Cancer Paternal Aunt         brain       Social History     Socioeconomic History    Marital status:      Spouse name: Not on file    Number of children: Not on file    Years of education: Not on file    Highest education level: Not on file   Occupational History    Not on file   Social Needs    Financial resource strain: Not on file    Food insecurity:     Worry: Not on file     Inability: Not on file    Transportation needs:     Medical: Not on file     Non-medical: Not on file   Tobacco Use    Smoking status: Never Smoker    Smokeless tobacco: Never Used   Substance and Sexual Activity    Alcohol use: Yes     Comment: occasionally    Drug use: No    Sexual activity: Not Currently     Birth control/protection: None   Lifestyle    Physical activity:     Days per week: Not on file     Minutes per session: Not on file    Stress: Not on file   Relationships    Social connections:     Talks on phone: Not on file     Gets together: Not on file     Attends [employer] service: Not on file     Active member of club or organization: Not on file     Attends meetings of clubs or organizations: Not on file     Relationship status: Not on file    Intimate partner violence:     Fear of current or ex partner: Not on file     Emotionally abused: Not on file     Physically abused: Not on file     Forced sexual activity: Not on file   Other Topics Concern    Not on file   Social History Narrative    Not on file         ALLERGIES: Hydrocodone-guaifenesin and Other medication    Review of Systems   Constitutional: Negative for chills and fever. HENT: Negative for congestion and rhinorrhea. Eyes: Negative for redness and visual disturbance. Respiratory: Negative for cough and shortness of breath. Cardiovascular: Positive for chest pain. Negative for leg swelling. Gastrointestinal: Negative for abdominal pain, diarrhea, nausea and vomiting. Genitourinary: Negative for dysuria, flank pain, frequency, hematuria and urgency. Musculoskeletal: Negative for arthralgias, back pain, myalgias and neck pain. Skin: Negative for rash and wound. Allergic/Immunologic: Negative for immunocompromised state. Neurological: Positive for headaches. Negative for dizziness. Vitals:    12/22/19 1217   BP: (!) 189/92   Pulse: 78   Resp: 16   Temp: 97.5 °F (36.4 °C)   SpO2: 99%   Weight: 81 kg (178 lb 9.2 oz)   Height: 5' 3\" (1.6 m)            Physical Exam  Vitals signs and nursing note reviewed. Constitutional:       General: She is not in acute distress. Appearance: She is well-developed.  She is not diaphoretic. HENT:      Head: Normocephalic. Mouth/Throat:      Pharynx: No oropharyngeal exudate. Eyes:      General:         Right eye: No discharge. Left eye: No discharge. Pupils: Pupils are equal, round, and reactive to light. Neck:      Musculoskeletal: Normal range of motion and neck supple. Cardiovascular:      Rate and Rhythm: Normal rate and regular rhythm. Heart sounds: Normal heart sounds. No murmur. No friction rub. No gallop. Pulmonary:      Effort: Pulmonary effort is normal. No respiratory distress. Breath sounds: Normal breath sounds. No stridor. No wheezing or rales. Abdominal:      General: Bowel sounds are normal. There is no distension. Palpations: Abdomen is soft. Tenderness: There is no tenderness. There is no guarding or rebound. Musculoskeletal: Normal range of motion. General: No deformity. Skin:     General: Skin is warm and dry. Capillary Refill: Capillary refill takes less than 2 seconds. Findings: No rash. Neurological:      Mental Status: She is alert and oriented to person, place, and time.       Comments: CN II-XII tested and intact  Speech is clear and fluid  Tongue protrusion normal  5/5 b/l strength with shoulder/elbow flexion and extension  Equal  strength  5/5 b/l strength with hip extension, knee flexion/extension  Symmetric dorsi and plantar-flexion of feet  Sensation intact in face and throughout all 4 extremities  No dysmetria   No truncal ataxia   Steady gait     Psychiatric:         Behavior: Behavior normal.            EKG Interpretation:   ED Physician interpretation  NSR rate 78, normal axis, normal intervals, no ST elevation or depression    Labs Reviewed:   CBC: no significant leukocytosis or anemia   CMP: normal renal function, no significant electrolyte abnormality, glucose normal, LFT within normal limits  Trop: normal  UA: not c/w UTI or proteinuria        Imaging Reviewed:   CXR neg for acute process      Course:  Blood pressure improved to 130's spontaneously x 2 readings        MDM:  64-year-old female presenting to the emergency department today with elevated blood pressure readings. She states that she is been noticing this on and off for the past 3 weeks. She was on blood pressure medication the past but currently is not. Upon arrival here her blood pressure was in the 180s however improved to 130 spontaneously. She has mild chest tightness but states that she feels like she has had this for a while. EKG without acute ischemia. Troponin negative and I would expect this to be elevated if this were acute coronary syndrome with the duration of her symptoms. Nonfocal neurologic exam.  At this time I do not feel she qualifies for hypertensive urgency or emergency. She has been under a lot of stress recently and I think this may be contributing. She was discharged home with strict return precautions and advised to follow-up with her PCP. I had a lengthy discussion regarding diet and exercise as a first-line therapy for high blood pressure. At this time I will hold off on any antihypertensive medications. Clinical Impression:     ICD-10-CM ICD-9-CM    1. Chest discomfort R07.89 786.59    2.  Elevated blood pressure reading R03.0 796.2            Disposition: DARIELA Brown DO

## 2019-12-22 NOTE — DISCHARGE INSTRUCTIONS
PLEASE RETURN IF WORSENING PAIN OR IF YOU NOTICE IT WHEN EXERTING YOURSELF, TROUBLE BREATHING, SWEATINESS, OR DIZZINESS. FOLLOW UP WITH YOUR DOCTOR IN THE NEXT 2-3 DAYS.      IF YOU DEVELOP BLURRED VISION, BAD HEADACHE, WEAKNESS, OR NUMBNESS THESE ARE ALSO REASONS TO RETURN

## 2019-12-22 NOTE — ED TRIAGE NOTES
Pt ambulates to treatment area she states that this morning she did not \"feel on\" along with a dull pain to the back of her head and some chest tightness since yesterday. She had the [facility] nurse check her BP and it was 182/100. She notes that yesterday she also had some tightness in her chest and she took her BP and it was 189/100's.   She denies any N/V or SOB with these symptoms

## 2019-12-23 LAB
ATRIAL RATE: 78 BPM
CALCULATED P AXIS, ECG09: 46 DEGREES
CALCULATED R AXIS, ECG10: 53 DEGREES
CALCULATED T AXIS, ECG11: 33 DEGREES
DIAGNOSIS, 93000: NORMAL
P-R INTERVAL, ECG05: 168 MS
Q-T INTERVAL, ECG07: 390 MS
QRS DURATION, ECG06: 86 MS
QTC CALCULATION (BEZET), ECG08: 444 MS
VENTRICULAR RATE, ECG03: 78 BPM

## 2020-12-23 ENCOUNTER — HOSPITAL ENCOUNTER (OUTPATIENT)
Dept: MAMMOGRAPHY | Age: 60
Discharge: HOME | End: 2020-12-23
Attending: FAMILY MEDICINE
Payer: COMMERCIAL

## 2020-12-23 DIAGNOSIS — Z12.31 ENCOUNTER FOR SCREENING MAMMOGRAM FOR MALIGNANT NEOPLASM OF BREAST: ICD-10-CM

## 2020-12-23 PROCEDURE — 77067 SCR MAMMO BI INCL CAD: CPT

## 2021-12-10 ENCOUNTER — TRANSCRIBE ORDER (OUTPATIENT)
Dept: SCHEDULING | Age: 61
End: 2021-12-10

## 2021-12-10 DIAGNOSIS — Z12.31 SCREENING MAMMOGRAM FOR HIGH-RISK PATIENT: Primary | ICD-10-CM

## 2021-12-13 ENCOUNTER — TRANSCRIBE ORDER (OUTPATIENT)
Dept: GENERAL RADIOLOGY | Age: 61
End: 2021-12-13

## 2021-12-13 ENCOUNTER — HOSPITAL ENCOUNTER (OUTPATIENT)
Dept: GENERAL RADIOLOGY | Age: 61
Discharge: HOME | End: 2021-12-13
Payer: COMMERCIAL

## 2021-12-13 DIAGNOSIS — Z12.39 ENCOUNTER FOR OTHER SCREENING FOR MALIGNANT NEOPLASM OF BREAST: ICD-10-CM

## 2021-12-13 DIAGNOSIS — M54.50 LOW BACK PAIN: Primary | ICD-10-CM

## 2021-12-13 DIAGNOSIS — M54.50 LOW BACK PAIN: ICD-10-CM

## 2021-12-13 DIAGNOSIS — Z12.39 ENCOUNTER FOR OTHER SCREENING FOR MALIGNANT NEOPLASM OF BREAST: Primary | ICD-10-CM

## 2021-12-13 PROCEDURE — 72100 X-RAY EXAM L-S SPINE 2/3 VWS: CPT

## 2022-07-06 RX ORDER — ALLOPURINOL 300 MG/1
TABLET ORAL
COMMUNITY

## 2022-07-06 RX ORDER — LISINOPRIL AND HYDROCHLOROTHIAZIDE 25; 20 MG/1; MG/1
TABLET ORAL
COMMUNITY

## 2022-07-06 RX ORDER — ASPIRIN 81 MG/1
TABLET, CHEWABLE ORAL
COMMUNITY

## 2022-07-06 RX ORDER — GLYBURIDE 5 MG/1
TABLET ORAL
COMMUNITY

## 2023-05-11 RX ORDER — EPINEPHRINE 0.3 MG/.3ML
INJECTION SUBCUTANEOUS
COMMUNITY
Start: 2018-03-16

## 2023-09-15 ENCOUNTER — HOSPITAL ENCOUNTER (OUTPATIENT)
Facility: HOSPITAL | Age: 63
Discharge: HOME | End: 2023-09-15
Payer: COMMERCIAL

## 2023-09-15 DIAGNOSIS — R52 PAIN: ICD-10-CM

## 2023-09-15 PROCEDURE — 73502 X-RAY EXAM HIP UNI 2-3 VIEWS: CPT

## 2023-09-15 PROCEDURE — 73562 X-RAY EXAM OF KNEE 3: CPT

## 2023-09-15 PROCEDURE — 73030 X-RAY EXAM OF SHOULDER: CPT

## 2024-01-03 ENCOUNTER — HOSPITAL ENCOUNTER (OUTPATIENT)
Facility: HOSPITAL | Age: 64
Discharge: HOME | End: 2024-01-06

## 2024-01-03 DIAGNOSIS — M79.601 PAIN IN RIGHT ARM: ICD-10-CM

## 2024-05-16 ENCOUNTER — TRANSCRIBE ORDERS (OUTPATIENT)
Facility: HOSPITAL | Age: 64
End: 2024-05-16

## 2024-05-16 DIAGNOSIS — Z12.31 SCREENING MAMMOGRAM FOR HIGH-RISK PATIENT: Primary | ICD-10-CM

## 2024-06-18 ENCOUNTER — HOSPITAL ENCOUNTER (OUTPATIENT)
Facility: HOSPITAL | Age: 64
Discharge: HOME | End: 2024-06-21
Payer: COMMERCIAL

## 2024-06-18 VITALS — HEIGHT: 62 IN | WEIGHT: 165 LBS | BODY MASS INDEX: 30.36 KG/M2

## 2024-06-18 DIAGNOSIS — Z12.31 SCREENING MAMMOGRAM FOR HIGH-RISK PATIENT: ICD-10-CM

## 2024-06-18 PROCEDURE — 77063 BREAST TOMOSYNTHESIS BI: CPT

## 2024-08-26 ENCOUNTER — HOSPITAL ENCOUNTER (EMERGENCY)
Facility: HOSPITAL | Age: 64
Discharge: HOME | End: 2024-08-26
Attending: EMERGENCY MEDICINE
Payer: COMMERCIAL

## 2024-08-26 VITALS
SYSTOLIC BLOOD PRESSURE: 144 MMHG | RESPIRATION RATE: 16 BRPM | BODY MASS INDEX: 28.35 KG/M2 | HEART RATE: 105 BPM | OXYGEN SATURATION: 95 % | DIASTOLIC BLOOD PRESSURE: 77 MMHG | HEIGHT: 63 IN | TEMPERATURE: 98.6 F | WEIGHT: 160 LBS

## 2024-08-26 DIAGNOSIS — L08.9 LEFT FOOT INFECTION: Primary | ICD-10-CM

## 2024-08-26 PROCEDURE — 6370000000 HC RX 637 (ALT 250 FOR IP): Performed by: PHYSICIAN ASSISTANT

## 2024-08-26 PROCEDURE — 2500000003 HC RX 250 WO HCPCS: Performed by: PHYSICIAN ASSISTANT

## 2024-08-26 PROCEDURE — 99283 EMERGENCY DEPT VISIT LOW MDM: CPT

## 2024-08-26 RX ORDER — ROSUVASTATIN CALCIUM 10 MG/1
10 TABLET, COATED ORAL DAILY
COMMUNITY
Start: 2023-08-17

## 2024-08-26 RX ORDER — NAPROXEN 250 MG/1
500 TABLET ORAL
Status: DISCONTINUED | OUTPATIENT
Start: 2024-08-26 | End: 2024-08-26

## 2024-08-26 RX ORDER — AMLODIPINE BESYLATE 5 MG/1
TABLET ORAL
COMMUNITY

## 2024-08-26 RX ORDER — NAPROXEN 250 MG/1
500 TABLET ORAL 2 TIMES DAILY PRN
Qty: 60 TABLET | Refills: 3 | Status: SHIPPED | OUTPATIENT
Start: 2024-08-26

## 2024-08-26 RX ORDER — DOXYCYCLINE HYCLATE 100 MG
100 TABLET ORAL 2 TIMES DAILY
Qty: 14 TABLET | Refills: 0 | Status: SHIPPED | OUTPATIENT
Start: 2024-08-26 | End: 2024-09-02

## 2024-08-26 RX ORDER — IBUPROFEN 600 MG/1
600 TABLET, FILM COATED ORAL
Status: COMPLETED | OUTPATIENT
Start: 2024-08-26 | End: 2024-08-26

## 2024-08-26 RX ORDER — DOXYCYCLINE HYCLATE 100 MG
100 TABLET ORAL
Status: COMPLETED | OUTPATIENT
Start: 2024-08-26 | End: 2024-08-26

## 2024-08-26 RX ORDER — LIDOCAINE HYDROCHLORIDE AND EPINEPHRINE BITARTRATE 20; .01 MG/ML; MG/ML
20 INJECTION, SOLUTION SUBCUTANEOUS ONCE
Status: COMPLETED | OUTPATIENT
Start: 2024-08-26 | End: 2024-08-26

## 2024-08-26 RX ADMIN — LIDOCAINE HYDROCHLORIDE AND EPINEPHRINE 20 ML: 20; 10 INJECTION, SOLUTION INFILTRATION; PERINEURAL at 14:22

## 2024-08-26 RX ADMIN — Medication 3 ML: at 15:08

## 2024-08-26 RX ADMIN — IBUPROFEN 600 MG: 600 TABLET, FILM COATED ORAL at 14:47

## 2024-08-26 RX ADMIN — DOXYCYCLINE HYCLATE 100 MG: 100 TABLET, COATED ORAL at 14:22

## 2024-08-26 ASSESSMENT — PAIN DESCRIPTION - ORIENTATION
ORIENTATION: LEFT
ORIENTATION: LEFT

## 2024-08-26 ASSESSMENT — PAIN DESCRIPTION - LOCATION
LOCATION: FOOT
LOCATION: FOOT

## 2024-08-26 ASSESSMENT — ENCOUNTER SYMPTOMS
VOMITING: 0
COUGH: 0
COLOR CHANGE: 1
DIARRHEA: 0

## 2024-08-26 ASSESSMENT — PAIN - FUNCTIONAL ASSESSMENT
PAIN_FUNCTIONAL_ASSESSMENT: PREVENTS OR INTERFERES SOME ACTIVE ACTIVITIES AND ADLS
PAIN_FUNCTIONAL_ASSESSMENT: NONE - DENIES PAIN
PAIN_FUNCTIONAL_ASSESSMENT: PREVENTS OR INTERFERES SOME ACTIVE ACTIVITIES AND ADLS

## 2024-08-26 ASSESSMENT — LIFESTYLE VARIABLES: HOW OFTEN DO YOU HAVE A DRINK CONTAINING ALCOHOL: NEVER

## 2024-08-26 ASSESSMENT — PAIN SCALES - GENERAL
PAINLEVEL_OUTOF10: 0
PAINLEVEL_OUTOF10: 9
PAINLEVEL_OUTOF10: 0

## 2024-08-26 ASSESSMENT — PAIN DESCRIPTION - DESCRIPTORS
DESCRIPTORS: BURNING
DESCRIPTORS: STABBING;THROBBING;SHOOTING

## 2024-08-26 NOTE — ED TRIAGE NOTES
Patient presents ambulatory to treatment area with a steady gait. Patient states she has an infection on the bottom of her foot.  Saw PCP last week and was given an antibiotic (bactrim).  States since that time, she has developed more pain and redness.  States this infection began when she picked at a wart.  States the area started itching about 7 days ago and has intensified from there.  Denies known fevers.

## 2024-08-26 NOTE — ED PROVIDER NOTES
[facility] EMERGENCY DEPT  EMERGENCY DEPARTMENT ENCOUNTER      Pt Name: Radha Solis  MRN: [id number]  Birthdate 1960  Date of evaluation: 8/26/2024  Provider: CHERI Diane    CHIEF COMPLAINT       Chief Complaint   Patient presents with    Skin Problem         HISTORY OF PRESENT ILLNESS   (Location/Symptom, Timing/Onset, Context/Setting, Quality, Duration, Modifying Factors, Severity)  Note limiting factors.   65 y/o female presenting with complaint of skin problem.  The patient states that she has had an infection on the bottom of her foot for the past 2 weeks.  She saw her PCP 6 days ago and was prescribed Bactrim and mupirocin cream, which she has been using as directed.  She notes that the infection has continued to worsen somewhat over the past 6 days.  She rates her pain 9/10, described as burning, nonradiating.  She denies fevers, weakness, numbness, open wound, or generalized body aches.    The history is provided by the patient.         Review of External Medical Records:     Nursing Notes were reviewed.    REVIEW OF SYSTEMS    (2-9 systems for level 4, 10 or more for level 5)     Review of Systems   Constitutional:  Negative for chills and fever.   HENT:  Negative for congestion.    Respiratory:  Negative for cough.    Gastrointestinal:  Negative for diarrhea and vomiting.   Musculoskeletal:  Negative for myalgias.   Skin:  Positive for color change. Negative for wound.   Neurological:  Negative for weakness and numbness.       Except as noted above the remainder of the review of systems was reviewed and negative.       PAST MEDICAL HISTORY     Past Medical History:   Diagnosis Date    Adenomyosis     AR (allergic rhinitis) 6/10/2009    Asthma 6/10/2009    Dysfunctional uterine bleeding     Fibromyalgia 6/10/2009    Generalized anxiety disorder     GERD (gastroesophageal reflux disease) 6/10/2009    Hypermenorrhea     Infertility, female     Inflammatory arthritis     Iron deficiency anemia

## 2024-12-30 ENCOUNTER — HOSPITAL ENCOUNTER (OUTPATIENT)
Facility: HOSPITAL | Age: 64
Discharge: HOME | End: 2025-01-02
Payer: COMMERCIAL

## 2024-12-30 ENCOUNTER — TRANSCRIBE ORDERS (OUTPATIENT)
Facility: HOSPITAL | Age: 64
End: 2024-12-30

## 2024-12-30 DIAGNOSIS — M25.552 LEFT HIP PAIN: Primary | ICD-10-CM

## 2024-12-30 DIAGNOSIS — M25.551 RIGHT HIP PAIN: ICD-10-CM

## 2024-12-30 DIAGNOSIS — M25.552 LEFT HIP PAIN: ICD-10-CM

## 2024-12-30 PROCEDURE — 73521 X-RAY EXAM HIPS BI 2 VIEWS: CPT

## 2025-06-20 ENCOUNTER — HOSPITAL ENCOUNTER (EMERGENCY)
Facility: HOSPITAL | Age: 65
Discharge: HOME | End: 2025-06-20
Attending: STUDENT IN AN ORGANIZED HEALTH CARE EDUCATION/TRAINING PROGRAM
Payer: COMMERCIAL

## 2025-06-20 VITALS
OXYGEN SATURATION: 97 % | DIASTOLIC BLOOD PRESSURE: 78 MMHG | WEIGHT: 165 LBS | RESPIRATION RATE: 20 BRPM | BODY MASS INDEX: 29.23 KG/M2 | HEART RATE: 83 BPM | TEMPERATURE: 98.4 F | SYSTOLIC BLOOD PRESSURE: 183 MMHG | HEIGHT: 63 IN

## 2025-06-20 DIAGNOSIS — L98.9 SKIN LESION: Primary | ICD-10-CM

## 2025-06-20 PROCEDURE — 99283 EMERGENCY DEPT VISIT LOW MDM: CPT

## 2025-06-20 RX ORDER — BENZOCAINE/MENTHOL 6 MG-10 MG
LOZENGE MUCOUS MEMBRANE ONCE
Status: DISCONTINUED | OUTPATIENT
Start: 2025-06-20 | End: 2025-06-20 | Stop reason: RX

## 2025-06-20 RX ORDER — HYDROXYZINE HYDROCHLORIDE 50 MG/1
50 TABLET, FILM COATED ORAL EVERY 8 HOURS PRN
Qty: 30 TABLET | Refills: 0 | Status: SHIPPED | OUTPATIENT
Start: 2025-06-20 | End: 2025-06-30

## 2025-06-20 RX ORDER — FAMOTIDINE 20 MG/1
20 TABLET, FILM COATED ORAL
Status: DISCONTINUED | OUTPATIENT
Start: 2025-06-20 | End: 2025-06-20 | Stop reason: HOSPADM

## 2025-06-20 RX ORDER — METHYLPREDNISOLONE 4 MG/1
TABLET ORAL
COMMUNITY
Start: 2025-06-20

## 2025-06-20 RX ORDER — HYDROXYZINE HYDROCHLORIDE 25 MG/1
50 TABLET, FILM COATED ORAL ONCE
Status: DISCONTINUED | OUTPATIENT
Start: 2025-06-20 | End: 2025-06-20 | Stop reason: HOSPADM

## 2025-06-20 RX ORDER — FAMOTIDINE 20 MG/1
20 TABLET, FILM COATED ORAL 2 TIMES DAILY
Qty: 14 TABLET | Refills: 0 | Status: SHIPPED | OUTPATIENT
Start: 2025-06-20 | End: 2025-06-27

## 2025-06-20 ASSESSMENT — PAIN DESCRIPTION - PAIN TYPE: TYPE: ACUTE PAIN

## 2025-06-20 ASSESSMENT — PAIN SCALES - GENERAL: PAINLEVEL_OUTOF10: 5

## 2025-06-20 ASSESSMENT — PAIN DESCRIPTION - DESCRIPTORS: DESCRIPTORS: BURNING

## 2025-06-20 ASSESSMENT — PAIN DESCRIPTION - FREQUENCY: FREQUENCY: CONTINUOUS

## 2025-06-20 ASSESSMENT — PAIN DESCRIPTION - ORIENTATION: ORIENTATION: LEFT;UPPER;INNER

## 2025-06-20 ASSESSMENT — PAIN - FUNCTIONAL ASSESSMENT
PAIN_FUNCTIONAL_ASSESSMENT: ACTIVITIES ARE NOT PREVENTED
PAIN_FUNCTIONAL_ASSESSMENT: 0-10

## 2025-06-20 ASSESSMENT — PAIN DESCRIPTION - LOCATION: LOCATION: LEG

## 2025-06-20 NOTE — DISCHARGE INSTRUCTIONS
If the rash changes to the \"target lesion\" appearance, worsens, or is unchanged by tomorrow evening you can try the antibiotics. Be sure to finish the full course of antibiotics if you start it.     Take the pepcid and atarax for anti-histamine effect. You can use topical 1% hydrocortisone cream, which is available over the counter as well, on the lesion.
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
## 2025-06-20 NOTE — ED TRIAGE NOTES
Patient ambulatory to treatment room with spouse, with c/o \"bug bite on my upper left thigh that I noticed yesterday, and I went to my PCP today, and he gave me a prescription for a steroid and I took a dose today.\"     Patient states the site is burning, 5/10 pain.

## 2025-08-12 ENCOUNTER — HOSPITAL ENCOUNTER (OUTPATIENT)
Facility: HOSPITAL | Age: 65
Discharge: HOME | End: 2025-08-15
Payer: COMMERCIAL

## 2025-08-12 VITALS — BODY MASS INDEX: 29.23 KG/M2 | WEIGHT: 165 LBS

## 2025-08-12 DIAGNOSIS — Z12.31 VISIT FOR SCREENING MAMMOGRAM: ICD-10-CM

## 2025-08-12 PROCEDURE — 77063 BREAST TOMOSYNTHESIS BI: CPT

## 2025-08-15 ENCOUNTER — TRANSCRIBE ORDERS (OUTPATIENT)
Facility: HOSPITAL | Age: 65
End: 2025-08-15

## 2025-08-15 DIAGNOSIS — M79.604 PAIN IN RIGHT LEG: Primary | ICD-10-CM

## 2025-08-22 ENCOUNTER — HOSPITAL ENCOUNTER (OUTPATIENT)
Facility: HOSPITAL | Age: 65
Discharge: HOME | End: 2025-08-25
Payer: COMMERCIAL

## 2025-08-22 DIAGNOSIS — M79.604 PAIN IN RIGHT LEG: ICD-10-CM

## 2025-08-22 PROCEDURE — 73700 CT LOWER EXTREMITY W/O DYE: CPT
